# Patient Record
Sex: FEMALE | Race: WHITE | URBAN - METROPOLITAN AREA
[De-identification: names, ages, dates, MRNs, and addresses within clinical notes are randomized per-mention and may not be internally consistent; named-entity substitution may affect disease eponyms.]

---

## 2022-04-25 ENCOUNTER — OFFICE VISIT (OUTPATIENT)
Dept: URBAN - METROPOLITAN AREA CLINIC 13 | Facility: CLINIC | Age: 37
End: 2022-04-25
Payer: COMMERCIAL

## 2022-04-25 DIAGNOSIS — H20.013 PRIMARY IRIDOCYCLITIS, BILATERAL: ICD-10-CM

## 2022-04-25 DIAGNOSIS — H40.9 GLAUCOMA: Primary | ICD-10-CM

## 2022-04-25 PROCEDURE — 92134 CPTRZ OPH DX IMG PST SGM RTA: CPT | Performed by: OPHTHALMOLOGY

## 2022-04-25 PROCEDURE — 99204 OFFICE O/P NEW MOD 45 MIN: CPT | Performed by: OPHTHALMOLOGY

## 2022-04-25 RX ORDER — PREDNISONE 20 MG/1
20 MG TABLET ORAL
Qty: 24 | Refills: 0 | Status: INACTIVE
Start: 2022-04-25 | End: 2022-05-03

## 2022-04-25 ASSESSMENT — INTRAOCULAR PRESSURE
OD: 13
OS: 12

## 2022-04-25 NOTE — IMPRESSION/PLAN
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 May benefit from intravitreal injections 1 week re-eval Oz

## 2022-04-25 NOTE — IMPRESSION/PLAN
Impression: Glaucoma: H40.9. Plan: Uveitic glaucoma Resolved IOP -- off timolol right eye Had seen Germania/Geovany/Angel/Chris in the past

## 2022-05-03 ENCOUNTER — OFFICE VISIT (OUTPATIENT)
Dept: URBAN - METROPOLITAN AREA CLINIC 13 | Facility: CLINIC | Age: 37
End: 2022-05-03
Payer: COMMERCIAL

## 2022-05-03 PROCEDURE — 92134 CPTRZ OPH DX IMG PST SGM RTA: CPT | Performed by: OPHTHALMOLOGY

## 2022-05-03 PROCEDURE — 99213 OFFICE O/P EST LOW 20 MIN: CPT | Performed by: OPHTHALMOLOGY

## 2022-05-03 RX ORDER — PREDNISONE 20 MG/1
20 MG TABLET ORAL
Qty: 24 | Refills: 0 | Status: INACTIVE
Start: 2022-05-03 | End: 2022-05-16

## 2022-05-03 ASSESSMENT — INTRAOCULAR PRESSURE
OS: 17
OD: 19

## 2022-05-03 NOTE — IMPRESSION/PLAN
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 May benefit from intravitreal injections 1 m OCT OU  re-eval Oz OU

## 2022-05-31 ENCOUNTER — OFFICE VISIT (OUTPATIENT)
Dept: URBAN - METROPOLITAN AREA CLINIC 13 | Facility: CLINIC | Age: 37
End: 2022-05-31
Payer: COMMERCIAL

## 2022-05-31 DIAGNOSIS — H40.9 GLAUCOMA: Primary | ICD-10-CM

## 2022-05-31 DIAGNOSIS — H20.013 PRIMARY IRIDOCYCLITIS, BILATERAL: ICD-10-CM

## 2022-05-31 PROCEDURE — 92134 CPTRZ OPH DX IMG PST SGM RTA: CPT | Performed by: OPHTHALMOLOGY

## 2022-05-31 PROCEDURE — 99213 OFFICE O/P EST LOW 20 MIN: CPT | Performed by: OPHTHALMOLOGY

## 2022-05-31 ASSESSMENT — INTRAOCULAR PRESSURE
OD: 14
OS: 21

## 2022-05-31 NOTE — IMPRESSION/PLAN
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  

3 m OCT OU  re-eval Oz OU